# Patient Record
Sex: FEMALE | Race: OTHER | NOT HISPANIC OR LATINO
[De-identification: names, ages, dates, MRNs, and addresses within clinical notes are randomized per-mention and may not be internally consistent; named-entity substitution may affect disease eponyms.]

---

## 2017-03-29 ENCOUNTER — RESULT REVIEW (OUTPATIENT)
Age: 42
End: 2017-03-29

## 2017-04-03 ENCOUNTER — RESULT REVIEW (OUTPATIENT)
Age: 42
End: 2017-04-03

## 2017-05-03 ENCOUNTER — RESULT REVIEW (OUTPATIENT)
Age: 42
End: 2017-05-03

## 2017-05-19 ENCOUNTER — RESULT REVIEW (OUTPATIENT)
Age: 42
End: 2017-05-19

## 2022-03-24 NOTE — ASU PATIENT PROFILE, ADULT - FALL HARM RISK - UNIVERSAL INTERVENTIONS
Bed in lowest position, wheels locked, appropriate side rails in place/Call bell, personal items and telephone in reach/Instruct patient to call for assistance before getting out of bed or chair/Non-slip footwear when patient is out of bed/[place] to call system/Physically safe environment - no spills, clutter or unnecessary equipment/Purposeful Proactive Rounding/Room/bathroom lighting operational, light cord in reach

## 2022-03-25 ENCOUNTER — RESULT REVIEW (OUTPATIENT)
Age: 47
End: 2022-03-25

## 2022-03-25 ENCOUNTER — OUTPATIENT (OUTPATIENT)
Dept: OUTPATIENT SERVICES | Facility: HOSPITAL | Age: 47
LOS: 1 days | Discharge: HOME | End: 2022-03-25
Payer: COMMERCIAL

## 2022-03-25 ENCOUNTER — TRANSCRIPTION ENCOUNTER (OUTPATIENT)
Age: 47
End: 2022-03-25

## 2022-03-25 VITALS
HEART RATE: 86 BPM | OXYGEN SATURATION: 98 % | DIASTOLIC BLOOD PRESSURE: 76 MMHG | RESPIRATION RATE: 16 BRPM | SYSTOLIC BLOOD PRESSURE: 115 MMHG | TEMPERATURE: 97 F

## 2022-03-25 VITALS
HEIGHT: 64 IN | SYSTOLIC BLOOD PRESSURE: 109 MMHG | TEMPERATURE: 98 F | RESPIRATION RATE: 14 BRPM | HEART RATE: 82 BPM | DIASTOLIC BLOOD PRESSURE: 70 MMHG | WEIGHT: 137.35 LBS

## 2022-03-25 DIAGNOSIS — Z98.891 HISTORY OF UTERINE SCAR FROM PREVIOUS SURGERY: Chronic | ICD-10-CM

## 2022-03-25 DIAGNOSIS — Z98.890 OTHER SPECIFIED POSTPROCEDURAL STATES: Chronic | ICD-10-CM

## 2022-03-25 PROCEDURE — 88305 TISSUE EXAM BY PATHOLOGIST: CPT | Mod: 26

## 2022-03-25 RX ORDER — FENTANYL CITRATE 50 UG/ML
25 INJECTION INTRAVENOUS
Refills: 0 | Status: DISCONTINUED | OUTPATIENT
Start: 2022-03-25 | End: 2022-03-25

## 2022-03-25 RX ORDER — CHOLECALCIFEROL (VITAMIN D3) 125 MCG
1 CAPSULE ORAL
Qty: 0 | Refills: 0 | DISCHARGE

## 2022-03-25 RX ORDER — APREPITANT 80 MG/1
40 CAPSULE ORAL ONCE
Refills: 0 | Status: COMPLETED | OUTPATIENT
Start: 2022-03-25 | End: 2022-03-25

## 2022-03-25 RX ORDER — ASCORBIC ACID 60 MG
1 TABLET,CHEWABLE ORAL
Qty: 0 | Refills: 0 | DISCHARGE

## 2022-03-25 RX ORDER — HYDROMORPHONE HYDROCHLORIDE 2 MG/ML
0.5 INJECTION INTRAMUSCULAR; INTRAVENOUS; SUBCUTANEOUS ONCE
Refills: 0 | Status: DISCONTINUED | OUTPATIENT
Start: 2022-03-25 | End: 2022-03-25

## 2022-03-25 RX ORDER — ACETAMINOPHEN 500 MG
975 TABLET ORAL ONCE
Refills: 0 | Status: COMPLETED | OUTPATIENT
Start: 2022-03-25 | End: 2022-03-25

## 2022-03-25 RX ORDER — SODIUM CHLORIDE 9 MG/ML
1000 INJECTION, SOLUTION INTRAVENOUS
Refills: 0 | Status: DISCONTINUED | OUTPATIENT
Start: 2022-03-25 | End: 2022-03-25

## 2022-03-25 RX ORDER — ONDANSETRON 8 MG/1
4 TABLET, FILM COATED ORAL ONCE
Refills: 0 | Status: COMPLETED | OUTPATIENT
Start: 2022-03-25 | End: 2022-03-25

## 2022-03-25 RX ADMIN — FENTANYL CITRATE 25 MICROGRAM(S): 50 INJECTION INTRAVENOUS at 13:37

## 2022-03-25 RX ADMIN — FENTANYL CITRATE 25 MICROGRAM(S): 50 INJECTION INTRAVENOUS at 11:20

## 2022-03-25 RX ADMIN — FENTANYL CITRATE 25 MICROGRAM(S): 50 INJECTION INTRAVENOUS at 11:30

## 2022-03-25 RX ADMIN — Medication 975 MILLIGRAM(S): at 06:09

## 2022-03-25 RX ADMIN — SODIUM CHLORIDE 100 MILLILITER(S): 9 INJECTION, SOLUTION INTRAVENOUS at 13:41

## 2022-03-25 RX ADMIN — ONDANSETRON 4 MILLIGRAM(S): 8 TABLET, FILM COATED ORAL at 12:30

## 2022-03-25 RX ADMIN — HYDROMORPHONE HYDROCHLORIDE 0.5 MILLIGRAM(S): 2 INJECTION INTRAMUSCULAR; INTRAVENOUS; SUBCUTANEOUS at 13:00

## 2022-03-25 RX ADMIN — APREPITANT 40 MILLIGRAM(S): 80 CAPSULE ORAL at 06:08

## 2022-03-25 NOTE — ASU DISCHARGE PLAN (ADULT/PEDIATRIC) - NS MD DC FALL RISK RISK
For information on Fall & Injury Prevention, visit: [email or web address] OR  [email or web address] OR  https://www.cdc.gov/steadi/patient.html

## 2022-03-25 NOTE — ASU DISCHARGE PLAN (ADULT/PEDIATRIC) - ASU DC SPECIAL INSTRUCTIONSFT
Keep dressing in place and dry until follow up appointment. Please call the office with any questions

## 2022-03-29 LAB — SURGICAL PATHOLOGY STUDY: SIGNIFICANT CHANGE UP

## 2023-08-29 NOTE — ASU PATIENT PROFILE, ADULT - BRADEN SCORE (IF 18 OR LESS ACTIVATE SKIN INJURY RISK INCREASED GUIDELINE), MLM
08/29/23 1028   IMM Letter   IMM Letter given to Patient/Family/Significant other/Guardian/POA/by: Given to patient by Hank HAYS   IMM Letter date given: 08/29/23   IMM Letter time given: 1029     Electronically signed by Iban Dickinson on 8/29/23 at 10:29 AM EDT 23

## 2024-04-23 PROBLEM — U07.1 COVID-19: Chronic | Status: ACTIVE | Noted: 2022-03-24

## 2024-04-23 PROBLEM — R12 HEARTBURN: Chronic | Status: ACTIVE | Noted: 2022-03-24

## 2024-04-29 ENCOUNTER — APPOINTMENT (OUTPATIENT)
Dept: OTOLARYNGOLOGY | Facility: CLINIC | Age: 49
End: 2024-04-29

## 2024-06-14 PROBLEM — D24.2 FIBROADENOMA OF LEFT BREAST: Status: ACTIVE | Noted: 2024-06-14

## 2024-06-18 ENCOUNTER — APPOINTMENT (OUTPATIENT)
Dept: BREAST CENTER | Facility: CLINIC | Age: 49
End: 2024-06-18
Payer: COMMERCIAL

## 2024-06-18 VITALS
DIASTOLIC BLOOD PRESSURE: 71 MMHG | WEIGHT: 131 LBS | HEIGHT: 64 IN | HEART RATE: 83 BPM | SYSTOLIC BLOOD PRESSURE: 109 MMHG | BODY MASS INDEX: 22.36 KG/M2

## 2024-06-18 DIAGNOSIS — D24.2 BENIGN NEOPLASM OF LEFT BREAST: ICD-10-CM

## 2024-06-18 PROCEDURE — 99204 OFFICE O/P NEW MOD 45 MIN: CPT

## 2024-06-18 RX ORDER — PNV NO.95/FERROUS FUM/FOLIC AC 28MG-0.8MG
TABLET ORAL
Refills: 0 | Status: ACTIVE | COMMUNITY

## 2024-06-18 RX ORDER — RIBOFLAVIN (VITAMIN B2) 50 MG
TABLET ORAL
Refills: 0 | Status: ACTIVE | COMMUNITY

## 2024-06-18 RX ORDER — MULTIVIT-MIN/IRON/FOLIC ACID/K 18-600-40
CAPSULE ORAL
Refills: 0 | Status: ACTIVE | COMMUNITY

## 2024-06-18 RX ORDER — MAGNESIUM OXIDE/MAG AA CHELATE 300 MG
CAPSULE ORAL
Refills: 0 | Status: ACTIVE | COMMUNITY

## 2024-06-18 RX ORDER — UBIDECARENONE/VIT E ACET 100MG-5
CAPSULE ORAL
Refills: 0 | Status: ACTIVE | COMMUNITY

## 2024-06-18 NOTE — CONSULT LETTER
[Dear  ___] : Dear ~COLT, [Consult Letter:] : I had the pleasure of evaluating your patient, [unfilled]. [Please see my note below.] : Please see my note below. [Consult Closing:] : Thank you very much for allowing me to participate in the care of this patient.  If you have any questions, please do not hesitate to contact me. [Sincerely,] : Sincerely, [FreeTextEntry2] : Dr. Garcia [FreeTextEntry3] : Lyle Rendon MD Chief of Breast Surgery Director of Breast Cancer Program [facility]

## 2024-06-18 NOTE — ASSESSMENT
[FreeTextEntry1] : 49 year old female presents for initial evaluation regarding biopsy-proven 5mm fibroadenoma at LEFT 9:00 4cmfn, first seen on US. Of note, patient with history of b/l reduction mammoplasty.  Most recent imaging hx: B/L sMMG 8/21/23 BIRADS-0 revealed a focal asymmetry with architectural distortion at the superior posterior lateral aspects of b/l breasts, likely due to recent reduction mammoplasty. Subsequent B/L DX MMG/US 10/3/23 BIRADS-4 revealed (1) probably benign focal asymmetries with architectural distortion posterior superolateral breasts bilaterally, likely postsurgical changes from reduction mammoplasty, with recommendation for short term follow-up bilateral diagnostic mammogram (was overdue in April 2024); (2) b/l benign-appearing breast cysts; (3) a 5mm L9n4 indeterminate mass which was biopsied 10/10/23 and revealed fibroadenoma, as noted above.  XAVI 9.1%. Patient without complaint. Physical exam WNL. Will proceed with Slide review (CBL path). Reviewed the process of slide review and discussed patients diagnosis in detail. Answered all patients questions. Plan for B/L DX MMG/US (*diagnostic mammo to f/u probably benign focal asymmetries with architectural distortion posterior superolateral breasts bilaterally) now. If benign, plan for re-examination in 6 months with PA-C. Patient verbalizes understanding and is in agreement with the plan.

## 2024-06-18 NOTE — DATA REVIEWED
[FreeTextEntry1] : 8/21/23 (R) B/L sMMG: scattered areas of fbg density. There is focal asymmetry with architectural distortion at the superior posterior lateral aspects of both breasts, likely due to recent reduction mammoplasty. Additional 3-D spot compression in MLO and exaggerated CC views is recommended. Targeted ultrasound can be obtained, if warranted. IMPRESSION: Additional imaging of both breasts is recommended. FOLLOW-UP: Additional imaging. BI-RADS Category 0:  Incomplete.   10/3/23 (R) B/L DX MMG/US: heterogeneously dense.  1. Probably benign focal asymmetries with architectural distortion posterior superolateral breasts bilaterally, likely postsurgical changes from reduction mammoplasty. Recommend 6 month follow-up bilateral diagnostic mammogram, which should include bilateral XCCL views.  2. Bilateral benign-appearing breast cysts. 3. 5 x 3 x 4 mm indeterminate mass left 9:00, 4 cm from the nipple, for which left breast ultrasound-guided core biopsy recommended. BI-RADS 4:  Suspicious.   10/10/23 ([facility]/CBL Path) US-guided biopsy of 5 mm hypoechoic mass in the left breast at 9:00-N4 (one clip): fibroadenoma; benign and concordant. Annual follow-up is recommended.

## 2024-06-18 NOTE — HISTORY OF PRESENT ILLNESS
[FreeTextEntry1] : 49 year old female was referred by Dr. Garcia who presents for initial evaluation regarding a biopsy-proven 5mm fibroadenoma at LEFT 9:00 4cmfn, first seen on US. Of note, patient with history of reduction mammoplasty in 2022 at [facility], cannot recall surgeons name. Patient denies palpable masses, nipple discharge, or skin changes.  Most recent imaging hx:  B/L sMMG 8/21/23 BIRADS-0 revealed a focal asymmetry with architectural distortion at the superior posterior lateral aspects of b/l breasts, likely due to recent reduction mammoplasty. Subsequent B/L DX MMG/US 10/3/23 BIRADS-4 revealed (1) probably benign focal asymmetries with architectural distortion posterior superolateral breasts bilaterally, likely postsurgical changes from reduction mammoplasty, with recommendation for short term follow-up bilateral diagnostic mammogram (was overdue in April 2024); (2) b/l benign-appearing breast cysts; (3) a 5mm L9n4 indeterminate mass which was biopsied 10/10/23 and revealed fibroadenoma, as noted above, with recommendation for annual screening (will be due in Aug 2024).   Patient denies family history of breast cancer. Denies famhx of ovarian cancer. Patient has not had genetic testing performed. Patient has one daughter.  Cristy Lifetime Risk 9.1%

## 2024-09-14 NOTE — BRIEF OPERATIVE NOTE - NSICDXBRIEFPREOP_GEN_ALL_CORE_FT
Accessed site: right femoral artery.   Ultrasound guidance was used. PRE-OP DIAGNOSIS:  Breast hypertrophy 25-Mar-2022 10:38:31  Niki Patel

## 2025-03-26 NOTE — ASU DISCHARGE PLAN (ADULT/PEDIATRIC) - CALL YOUR DOCTOR IF YOU HAVE ANY OF THE FOLLOWING:
Glabellar Complex Units: 0 Show Levator Superior Units: Yes Price (Use Numbers Only, No Special Characters Or $): 276 Show Ucl Units: No Consent: Written consent obtained. Risks include but not limited to lid/brow ptosis, bruising, swelling, diplopia, temporary effect, incomplete chemical denervation. Post-Care Instructions: Patient instructed to not lie down for 4 hours and limit physical activity for 24 hours. Patient instructed not to travel by airplane for 48 hours. Lot #: [id number] Incrementing Botox Units: By 0.5 Units Dilution (U/0.1 Cc): 4 Forehead Units: 32 Detail Level: Detailed Expiration Date (Month Year):  Additional Area 1 Location: glabella forehead Bleeding that does not stop/Fever greater than (need to indicate Fahrenheit or Celsius) Post-Care Instructions: Patient instructed to not lie down for 4 hours and limit physical activity for 24 hours. Map Statement: Please see attached map for locations and injection amounts.

## 2025-04-08 ENCOUNTER — APPOINTMENT (OUTPATIENT)
Dept: BREAST CENTER | Facility: CLINIC | Age: 50
End: 2025-04-08

## 2025-04-08 ENCOUNTER — NON-APPOINTMENT (OUTPATIENT)
Age: 50
End: 2025-04-08

## 2025-04-08 VITALS
HEART RATE: 96 BPM | BODY MASS INDEX: 22.2 KG/M2 | DIASTOLIC BLOOD PRESSURE: 94 MMHG | HEIGHT: 64 IN | SYSTOLIC BLOOD PRESSURE: 114 MMHG | WEIGHT: 130 LBS

## 2025-04-08 DIAGNOSIS — D24.2 BENIGN NEOPLASM OF LEFT BREAST: ICD-10-CM

## 2025-04-08 PROCEDURE — 99213 OFFICE O/P EST LOW 20 MIN: CPT

## 2025-04-08 RX ORDER — CETIRIZINE HYDROCHLORIDE 10 MG/1
10 TABLET, COATED ORAL
Refills: 0 | Status: ACTIVE | COMMUNITY

## 2025-04-08 RX ORDER — CLOBETASOL PROPIONATE CREAM USP, 0.05% 0.5 MG/G
0.05 CREAM TOPICAL
Refills: 0 | Status: ACTIVE | COMMUNITY

## 2025-09-12 ENCOUNTER — APPOINTMENT (OUTPATIENT)
Dept: BREAST CENTER | Facility: CLINIC | Age: 50
End: 2025-09-12

## (undated) DEVICE — BRA PINK SM 30-33"

## (undated) DEVICE — DRSG DERMABOND PRINEO 60CM

## (undated) DEVICE — STAPLER SKIN VISI-STAT 35 WIDE

## (undated) DEVICE — BLADE SCALPEL SAFETYLOCK #10

## (undated) DEVICE — DRSG SURGICAL BRA LG 38-40

## (undated) DEVICE — VENODYNE/SCD SLEEVE CALF MEDIUM

## (undated) DEVICE — DRSG DERMABOND PRINEO 22CM

## (undated) DEVICE — WARMING BLANKET LOWER ADULT

## (undated) DEVICE — STAPLER SKIN INSORB

## (undated) DEVICE — ELCTR PENCIL SMOKE EVACUATOR COATED PUSH BUTTON 70MM

## (undated) DEVICE — DRSG GAUZE MOISTURIZER 0.5 OZ 4X8

## (undated) DEVICE — DRSG KERLIX ROLL 4.5"

## (undated) DEVICE — ELCTR STRYKER NEPTUNE BLADE COATED, INSULATED 70MM

## (undated) DEVICE — DRSG SURGICAL BRA SM 34-36

## (undated) DEVICE — PREP BETADINE KIT

## (undated) DEVICE — NDL SPINAL 18G X 3.5" (PINK)

## (undated) DEVICE — Device

## (undated) DEVICE — TUBING INFILTRATION

## (undated) DEVICE — DRSG SURGICAL BRA XL 40-42

## (undated) DEVICE — MARKING PEN W RULER

## (undated) DEVICE — DRSG COMBINE 5X9"

## (undated) DEVICE — SUT SILK 2-0 18" FS

## (undated) DEVICE — SUT MONOCRYL 3-0 27" PS-2 UNDYED

## (undated) DEVICE — DRSG STERISTRIPS 0.5 X 4"

## (undated) DEVICE — DRAPE CHEST BREAST 106" X 122"

## (undated) DEVICE — LAP PAD 18 X 18"

## (undated) DEVICE — FOLEY TRAY 16FR 5CC LTX UMETER CLOSED

## (undated) DEVICE — DRAPE TOWEL 1000 SMALL 17" X 11"

## (undated) DEVICE — SOL IRR POUR NS 0.9% 1000ML

## (undated) DEVICE — SUT MONOCRYL 4-0 27" PS-2 UNDYED

## (undated) DEVICE — DRAPE SURGICAL #1010

## (undated) DEVICE — DRSG SURGICAL BRA MED 36-38

## (undated) DEVICE — DRAPE FLUID WARMER 44 X 44"

## (undated) DEVICE — DRAPE 3/4 SHEET W REINFORCEMENT 56X77"

## (undated) DEVICE — VENODYNE/SCD SLEEVE CALF LARGE

## (undated) DEVICE — DRSG STERISTRIPS 1 X 5"

## (undated) DEVICE — DRAPE TOP SHEET 53" X 101"

## (undated) DEVICE — DURABLE MEDICAL EQUIPMENT: Type: DURABLE MEDICAL EQUIPMENT

## (undated) DEVICE — SYR LUER LOK 30CC

## (undated) DEVICE — WARMING BLANKET FULL UNDERBODY

## (undated) DEVICE — PREP BETADINE SPONGE STICKS

## (undated) DEVICE — DRSG XEROFORM 5 X 9"

## (undated) DEVICE — TUBING MICROAIRE ASPIRATION SET 12FT

## (undated) DEVICE — DRAIN RESERVOIR FOR JACKSON PRATT 100CC CARDINAL

## (undated) DEVICE — GLV 6.5 PROTEXIS (WHITE)

## (undated) DEVICE — DRSG DERMABOND 0.7ML